# Patient Record
Sex: MALE | Race: WHITE | URBAN - METROPOLITAN AREA
[De-identification: names, ages, dates, MRNs, and addresses within clinical notes are randomized per-mention and may not be internally consistent; named-entity substitution may affect disease eponyms.]

---

## 2023-07-07 ENCOUNTER — OFFICE VISIT (OUTPATIENT)
Facility: LOCATION | Age: 73
End: 2023-07-07
Payer: MEDICARE

## 2023-07-07 DIAGNOSIS — H40.1131 PRIMARY OPEN-ANGLE GLAUCOMA BILATERAL MILD STAGE: Primary | ICD-10-CM

## 2023-07-07 DIAGNOSIS — H04.123 DRY EYE SYNDROME OF BILATERAL LACRIMAL GLANDS: ICD-10-CM

## 2023-07-07 DIAGNOSIS — H25.812 COMBINED FORMS OF AGE-RELATED CATARACT, LEFT EYE: ICD-10-CM

## 2023-07-07 PROCEDURE — 92133 CPTRZD OPH DX IMG PST SGM ON: CPT | Performed by: OPHTHALMOLOGY

## 2023-07-07 PROCEDURE — 99214 OFFICE O/P EST MOD 30 MIN: CPT | Performed by: OPHTHALMOLOGY

## 2023-07-07 ASSESSMENT — INTRAOCULAR PRESSURE
OS: 14
OD: 14

## 2023-07-07 NOTE — IMPRESSION/PLAN
Impression: Examination revealed cataract. Discussed with patient that cataracts are progressing but he continues happy with his vision at this time. Plan: Will revisit once patient feels visually bothered by cataracts.

## 2023-07-07 NOTE — IMPRESSION/PLAN
Impression: Examination revealed dry eye syndrome secondary to tear deficiencies. Plan: Increase ATs to BID-TID OU. Continue Erythromycin QHS OU.

## 2023-07-07 NOTE — IMPRESSION/PLAN
Impression: 6 months with OCT [address] OU. POHx: NTG mild OU, s/p CEIOL OD 2017, s/p SLT OU 2019 (pre op IOP 14/14), Cataract OS. FOHx: (-) for glaucoma. Eye Meds: Alphagan BID OU. Tmax 17/16. Target IOP: < 14 OU. Plan: Testing: OCT [address] 07/2023: OD bdl S/I, OS thin S>I. OU stable since 2015. HVF 24-2 11/2021: OU bdl high FL. OD IAS, OS normal. OD stable, OS improved. Pachys: 535/529. Gonio 11/2022: CB 1+ 360 OU. Today:
IOP acceptable OU. OCT ONH performed and reviewed. Informed patient glaucoma is holding stable at this time. Plan:
Continue Alphagan BID OU. RTC in 6 months with IOP check, gonio, and DFE OU.

## 2024-01-12 ENCOUNTER — OFFICE VISIT (OUTPATIENT)
Facility: LOCATION | Age: 74
End: 2024-01-12
Payer: MEDICARE

## 2024-01-12 DIAGNOSIS — H40.1131 PRIMARY OPEN-ANGLE GLAUCOMA BILATERAL MILD STAGE: Primary | ICD-10-CM

## 2024-01-12 DIAGNOSIS — H04.123 DRY EYE SYNDROME OF BILATERAL LACRIMAL GLANDS: ICD-10-CM

## 2024-01-12 DIAGNOSIS — H25.812 COMBINED FORMS OF AGE-RELATED CATARACT, LEFT EYE: ICD-10-CM

## 2024-01-12 PROCEDURE — 92020 GONIOSCOPY: CPT | Performed by: OPHTHALMOLOGY

## 2024-01-12 PROCEDURE — 99213 OFFICE O/P EST LOW 20 MIN: CPT | Performed by: OPHTHALMOLOGY

## 2024-01-12 ASSESSMENT — INTRAOCULAR PRESSURE
OD: 16
OS: 15

## 2024-07-18 ENCOUNTER — OFFICE VISIT (OUTPATIENT)
Facility: LOCATION | Age: 74
End: 2024-07-18
Payer: MEDICARE

## 2024-07-18 DIAGNOSIS — H04.123 DRY EYE SYNDROME OF BILATERAL LACRIMAL GLANDS: ICD-10-CM

## 2024-07-18 DIAGNOSIS — H25.812 COMBINED FORMS OF AGE-RELATED CATARACT, LEFT EYE: ICD-10-CM

## 2024-07-18 DIAGNOSIS — H40.1131 PRIMARY OPEN-ANGLE GLAUCOMA BILATERAL MILD STAGE: Primary | ICD-10-CM

## 2024-07-18 DIAGNOSIS — H35.372 PUCKERING OF MACULA, LEFT EYE: ICD-10-CM

## 2024-07-18 PROCEDURE — 99214 OFFICE O/P EST MOD 30 MIN: CPT | Performed by: OPHTHALMOLOGY

## 2024-07-18 PROCEDURE — 92133 CPTRZD OPH DX IMG PST SGM ON: CPT | Performed by: OPHTHALMOLOGY

## 2024-07-18 ASSESSMENT — INTRAOCULAR PRESSURE
OD: 14
OS: 15

## 2025-01-16 ENCOUNTER — OFFICE VISIT (OUTPATIENT)
Facility: LOCATION | Age: 75
End: 2025-01-16
Payer: MEDICARE

## 2025-01-16 DIAGNOSIS — H04.123 DRY EYE SYNDROME OF BILATERAL LACRIMAL GLANDS: ICD-10-CM

## 2025-01-16 DIAGNOSIS — H25.812 COMBINED FORMS OF AGE-RELATED CATARACT, LEFT EYE: ICD-10-CM

## 2025-01-16 DIAGNOSIS — H35.372 PUCKERING OF MACULA, LEFT EYE: ICD-10-CM

## 2025-01-16 DIAGNOSIS — H40.1131 PRIMARY OPEN-ANGLE GLAUCOMA BILATERAL MILD STAGE: Primary | ICD-10-CM

## 2025-01-16 PROCEDURE — 92020 GONIOSCOPY: CPT | Performed by: OPHTHALMOLOGY

## 2025-01-16 PROCEDURE — 99214 OFFICE O/P EST MOD 30 MIN: CPT | Performed by: OPHTHALMOLOGY

## 2025-01-16 RX ORDER — ERYTHROMYCIN 5 MG/G
OINTMENT OPHTHALMIC
Qty: 3.5 | Refills: 11 | Status: ACTIVE
Start: 2025-01-16

## 2025-01-16 RX ORDER — BRIMONIDINE TARTRATE 1 MG/ML
0.1 % SOLUTION/ DROPS OPHTHALMIC
Qty: 15 | Refills: 3 | Status: INACTIVE
Start: 2025-01-16 | End: 2026-01-15

## 2025-01-16 ASSESSMENT — INTRAOCULAR PRESSURE
OS: 15
OD: 15

## 2025-01-16 ASSESSMENT — VISUAL ACUITY
OS: 20/20
OD: 20/20